# Patient Record
Sex: FEMALE | Race: BLACK OR AFRICAN AMERICAN | NOT HISPANIC OR LATINO | Employment: FULL TIME | URBAN - METROPOLITAN AREA
[De-identification: names, ages, dates, MRNs, and addresses within clinical notes are randomized per-mention and may not be internally consistent; named-entity substitution may affect disease eponyms.]

---

## 2023-11-01 ENCOUNTER — APPOINTMENT (OUTPATIENT)
Dept: CT IMAGING | Facility: CLINIC | Age: 43
End: 2023-11-01
Attending: EMERGENCY MEDICINE
Payer: COMMERCIAL

## 2023-11-01 ENCOUNTER — HOSPITAL ENCOUNTER (EMERGENCY)
Facility: CLINIC | Age: 43
Discharge: HOME | End: 2023-11-01
Attending: EMERGENCY MEDICINE | Admitting: EMERGENCY MEDICINE
Payer: COMMERCIAL

## 2023-11-01 VITALS
TEMPERATURE: 98.1 F | HEART RATE: 64 BPM | DIASTOLIC BLOOD PRESSURE: 66 MMHG | WEIGHT: 130.3 LBS | RESPIRATION RATE: 16 BRPM | OXYGEN SATURATION: 100 % | SYSTOLIC BLOOD PRESSURE: 101 MMHG

## 2023-11-01 DIAGNOSIS — R10.84 GENERALIZED ABDOMINAL PAIN: ICD-10-CM

## 2023-11-01 DIAGNOSIS — R42 DIZZINESS: ICD-10-CM

## 2023-11-01 LAB
ALBUMIN SERPL BCG-MCNC: 4 G/DL (ref 3.5–5.2)
ALBUMIN UR-MCNC: NEGATIVE MG/DL
ALP SERPL-CCNC: 76 U/L (ref 35–104)
ALT SERPL W P-5'-P-CCNC: 11 U/L (ref 0–50)
ANION GAP SERPL CALCULATED.3IONS-SCNC: 7 MMOL/L (ref 7–15)
APPEARANCE UR: CLEAR
AST SERPL W P-5'-P-CCNC: 25 U/L (ref 0–45)
BASOPHILS # BLD AUTO: 0 10E3/UL (ref 0–0.2)
BASOPHILS NFR BLD AUTO: 1 %
BILIRUB SERPL-MCNC: 0.2 MG/DL
BILIRUB UR QL STRIP: NEGATIVE
BUN SERPL-MCNC: 7.4 MG/DL (ref 6–20)
CALCIUM SERPL-MCNC: 9 MG/DL (ref 8.6–10)
CHLORIDE SERPL-SCNC: 105 MMOL/L (ref 98–107)
COLOR UR AUTO: ABNORMAL
CREAT SERPL-MCNC: 0.49 MG/DL (ref 0.51–0.95)
DEPRECATED HCO3 PLAS-SCNC: 26 MMOL/L (ref 22–29)
EGFRCR SERPLBLD CKD-EPI 2021: >90 ML/MIN/1.73M2
EOSINOPHIL # BLD AUTO: 0.1 10E3/UL (ref 0–0.7)
EOSINOPHIL NFR BLD AUTO: 2 %
ERYTHROCYTE [DISTWIDTH] IN BLOOD BY AUTOMATED COUNT: 15.9 % (ref 10–15)
GLUCOSE SERPL-MCNC: 89 MG/DL (ref 70–99)
GLUCOSE UR STRIP-MCNC: NEGATIVE MG/DL
HCG UR QL: NEGATIVE
HCT VFR BLD AUTO: 36.8 % (ref 35–47)
HGB BLD-MCNC: 11.6 G/DL (ref 11.7–15.7)
HGB UR QL STRIP: NEGATIVE
IMM GRANULOCYTES # BLD: 0 10E3/UL
IMM GRANULOCYTES NFR BLD: 1 %
KETONES UR STRIP-MCNC: NEGATIVE MG/DL
LEUKOCYTE ESTERASE UR QL STRIP: NEGATIVE
LYMPHOCYTES # BLD AUTO: 1.9 10E3/UL (ref 0.8–5.3)
LYMPHOCYTES NFR BLD AUTO: 42 %
MCH RBC QN AUTO: 26.5 PG (ref 26.5–33)
MCHC RBC AUTO-ENTMCNC: 31.5 G/DL (ref 31.5–36.5)
MCV RBC AUTO: 84 FL (ref 78–100)
MONOCYTES # BLD AUTO: 0.5 10E3/UL (ref 0–1.3)
MONOCYTES NFR BLD AUTO: 12 %
MUCOUS THREADS #/AREA URNS LPF: PRESENT /LPF
NEUTROPHILS # BLD AUTO: 1.8 10E3/UL (ref 1.6–8.3)
NEUTROPHILS NFR BLD AUTO: 42 %
NITRATE UR QL: NEGATIVE
NRBC # BLD AUTO: 0 10E3/UL
NRBC BLD AUTO-RTO: 0 /100
PH UR STRIP: 6.5 [PH] (ref 5–7)
PLATELET # BLD AUTO: 239 10E3/UL (ref 150–450)
POTASSIUM SERPL-SCNC: 3.6 MMOL/L (ref 3.4–5.3)
PROT SERPL-MCNC: 7.3 G/DL (ref 6.4–8.3)
RBC # BLD AUTO: 4.38 10E6/UL (ref 3.8–5.2)
RBC URINE: 0 /HPF
SODIUM SERPL-SCNC: 138 MMOL/L (ref 135–145)
SP GR UR STRIP: 1 (ref 1–1.03)
SQUAMOUS EPITHELIAL: <1 /HPF
UROBILINOGEN UR STRIP-MCNC: NORMAL MG/DL
WBC # BLD AUTO: 4.3 10E3/UL (ref 4–11)
WBC URINE: 0 /HPF

## 2023-11-01 PROCEDURE — 99284 EMERGENCY DEPT VISIT MOD MDM: CPT | Performed by: EMERGENCY MEDICINE

## 2023-11-01 PROCEDURE — 81025 URINE PREGNANCY TEST: CPT | Performed by: EMERGENCY MEDICINE

## 2023-11-01 PROCEDURE — 96361 HYDRATE IV INFUSION ADD-ON: CPT

## 2023-11-01 PROCEDURE — 36415 COLL VENOUS BLD VENIPUNCTURE: CPT | Performed by: EMERGENCY MEDICINE

## 2023-11-01 PROCEDURE — 80053 COMPREHEN METABOLIC PANEL: CPT | Performed by: EMERGENCY MEDICINE

## 2023-11-01 PROCEDURE — 258N000003 HC RX IP 258 OP 636: Performed by: EMERGENCY MEDICINE

## 2023-11-01 PROCEDURE — 74176 CT ABD & PELVIS W/O CONTRAST: CPT

## 2023-11-01 PROCEDURE — 81001 URINALYSIS AUTO W/SCOPE: CPT | Performed by: EMERGENCY MEDICINE

## 2023-11-01 PROCEDURE — 99284 EMERGENCY DEPT VISIT MOD MDM: CPT | Mod: 25

## 2023-11-01 PROCEDURE — 87491 CHLMYD TRACH DNA AMP PROBE: CPT | Performed by: EMERGENCY MEDICINE

## 2023-11-01 PROCEDURE — 85025 COMPLETE CBC W/AUTO DIFF WBC: CPT | Performed by: EMERGENCY MEDICINE

## 2023-11-01 PROCEDURE — 96360 HYDRATION IV INFUSION INIT: CPT

## 2023-11-01 RX ADMIN — SODIUM CHLORIDE 1000 ML: 9 INJECTION, SOLUTION INTRAVENOUS at 17:11

## 2023-11-01 ASSESSMENT — ACTIVITIES OF DAILY LIVING (ADL)
ADLS_ACUITY_SCORE: 35

## 2023-11-01 NOTE — ED TRIAGE NOTES
Triage Assessment (Adult)       Row Name 11/01/23 1546          Triage Assessment    Airway WDL WDL        Respiratory WDL    Respiratory WDL WDL        Skin Circulation/Temperature WDL    Skin Circulation/Temperature WDL WDL        Cardiac WDL    Cardiac WDL WDL        Peripheral/Neurovascular WDL    Peripheral Neurovascular WDL WDL        Cognitive/Neuro/Behavioral WDL    Cognitive/Neuro/Behavioral WDL WDL

## 2023-11-01 NOTE — ED PROVIDER NOTES
ED Provider Note  [facility]      History     Chief Complaint   Patient presents with    Anorexia     Loss of appetite and insomnia.       HPI  Dora Tello is a 43 year old female with no pertinent medical history who presents to the ED for dizziness, fatigue, and inability to eat or sleep.  She says she was having trouble with sleep for the past 8 months.  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 She is unable to fall asleep until 11 am and then sleeps until 5 pm.  She has 2 days off per week.  She says she wants to then just sleep all day but doesn't want to do that. She doesn't take any sleep aids.  She has noted lack of appetite for the past 2 months.  She has lost 14 lbs over the past 8 months.  She felt dizzy today so came here for evaluation. She also has lower and upper abdominal pain. She says she has issues with eating.  No stooling concerns. She is not pregnant. Her menses last 5 days.  No urination concerns.  No f/c.  She does not have a pcp.  No ear ringing/pain/discharge.     History reviewed. No pertinent past medical history.  Past Surgical History:   Procedure Laterality Date     SECTION       No current outpatient medications on file.    No Known Allergies  Family History  No family history on file.  Social History          A medically appropriate review of systems was performed with pertinent positives and negatives noted in the HPI, and all other systems negative.    Physical Exam   BP: 116/78  Pulse: 84  Temp: 97.5  F (36.4  C)  Resp: 16  Weight: 59.1 kg (130 lb 4.8 oz)  SpO2: 99 %  Lying Orthostatic BP: 103/69  Lying Orthostatic Pulse: 72 bpm  Sitting Orthostatic BP: 112/76  Sitting Orthostatic Pulse: 70 bpm  Standing Orthostatic BP: 112/74  Standing Orthostatic Pulse: 69 bpm  Physical Exam  Vitals and nursing note reviewed.   Constitutional:       General: She is not in acute distress.     Appearance: She is normal weight. She is not ill-appearing,  toxic-appearing or diaphoretic.   HENT:      Head: Normocephalic and atraumatic.      Right Ear: External ear normal.      Left Ear: External ear normal.      Nose: Nose normal.      Mouth/Throat:      Mouth: Mucous membranes are moist.   Eyes:      General: No scleral icterus.        Right eye: No discharge.         Left eye: No discharge.      Extraocular Movements: Extraocular movements intact.      Conjunctiva/sclera: Conjunctivae normal.      Pupils: Pupils are equal, round, and reactive to light.   Cardiovascular:      Rate and Rhythm: Normal rate and regular rhythm.      Heart sounds: Normal heart sounds.   Pulmonary:      Effort: Pulmonary effort is normal.      Breath sounds: Normal breath sounds.   Abdominal:      General: Abdomen is flat. Bowel sounds are normal. There is no distension.      Palpations: Abdomen is soft. There is no mass.      Tenderness: There is no abdominal tenderness. There is no guarding or rebound.      Hernia: No hernia is present.   Musculoskeletal:         General: Normal range of motion.      Cervical back: Normal range of motion and neck supple.   Skin:     General: Skin is warm and dry.      Coloration: Skin is not jaundiced or pale.   Neurological:      General: No focal deficit present.      Mental Status: She is alert and oriented to person, place, and time.   Psychiatric:         Mood and Affect: Mood normal.         Behavior: Behavior normal.         Thought Content: Thought content normal.         Judgment: Judgment normal.           ED Course, Procedures, & Data      Procedures                     Results for orders placed or performed during the hospital encounter of 11/01/23   CT Abdomen Pelvis w/o Contrast     Status: None    Narrative    EXAM: CT ABDOMEN PELVIS W/O CONTRAST  LOCATION: [facility]  DATE: 11/1/2023    INDICATION: pain upper abdomen and suprapubic.  COMPARISON: None.  TECHNIQUE: CT scan of the abdomen and  pelvis was performed without IV contrast. Multiplanar reformats were obtained. Dose reduction techniques were used.  CONTRAST: None.    FINDINGS:   LOWER CHEST: Small hiatal hernia. Visualization of the left ventricular myocardium on noncontrast CT is compatible with anemia.    HEPATOBILIARY: Normal.    PANCREAS: Normal.    SPLEEN: Normal.    ADRENAL GLANDS: Normal.    KIDNEYS/BLADDER: Normal.    BOWEL: No free air, free fluid, or inflammatory change. No obstruction. Normal appendix.    LYMPH NODES: Normal.    VASCULATURE: Unremarkable.    PELVIC ORGANS: Normal.    MUSCULOSKELETAL: Normal.      Impression    IMPRESSION:   1.  No acute abnormality in the abdomen or pelvis.     Comprehensive metabolic panel     Status: Abnormal   Result Value Ref Range    Sodium 138 135 - 145 mmol/L    Potassium 3.6 3.4 - 5.3 mmol/L    Carbon Dioxide (CO2) 26 22 - 29 mmol/L    Anion Gap 7 7 - 15 mmol/L    Urea Nitrogen 7.4 6.0 - 20.0 mg/dL    Creatinine 0.49 (L) 0.51 - 0.95 mg/dL    GFR Estimate >90 >60 mL/min/1.73m2    Calcium 9.0 8.6 - 10.0 mg/dL    Chloride 105 98 - 107 mmol/L    Glucose 89 70 - 99 mg/dL    Alkaline Phosphatase 76 35 - 104 U/L    AST 25 0 - 45 U/L    ALT 11 0 - 50 U/L    Protein Total 7.3 6.4 - 8.3 g/dL    Albumin 4.0 3.5 - 5.2 g/dL    Bilirubin Total 0.2 <=1.2 mg/dL   UA with Microscopic reflex to Culture     Status: Abnormal    Specimen: Urine, Clean Catch   Result Value Ref Range    Color Urine Straw Colorless, Straw, Light Yellow, Yellow    Appearance Urine Clear Clear    Glucose Urine Negative Negative mg/dL    Bilirubin Urine Negative Negative    Ketones Urine Negative Negative mg/dL    Specific Gravity Urine 1.005 1.003 - 1.035    Blood Urine Negative Negative    pH Urine 6.5 5.0 - 7.0    Protein Albumin Urine Negative Negative mg/dL    Urobilinogen Urine Normal Normal, 2.0 mg/dL    Nitrite Urine Negative Negative    Leukocyte Esterase Urine Negative Negative    Mucus Urine Present (A) None Seen /LPF    RBC  Urine 0 <=2 /HPF    WBC Urine 0 <=5 /HPF    Squamous Epithelials Urine <1 <=1 /HPF    Narrative    Urine Culture not indicated   HCG qualitative urine (UPT)     Status: Normal   Result Value Ref Range    hCG Urine Qualitative Negative Negative   CBC with platelets and differential     Status: Abnormal   Result Value Ref Range    WBC Count 4.3 4.0 - 11.0 10e3/uL    RBC Count 4.38 3.80 - 5.20 10e6/uL    Hemoglobin 11.6 (L) 11.7 - 15.7 g/dL    Hematocrit 36.8 35.0 - 47.0 %    MCV 84 78 - 100 fL    MCH 26.5 26.5 - 33.0 pg    MCHC 31.5 31.5 - 36.5 g/dL    RDW 15.9 (H) 10.0 - 15.0 %    Platelet Count 239 150 - 450 10e3/uL    % Neutrophils 42 %    % Lymphocytes 42 %    % Monocytes 12 %    % Eosinophils 2 %    % Basophils 1 %    % Immature Granulocytes 1 %    NRBCs per 100 WBC 0 <1 /100    Absolute Neutrophils 1.8 1.6 - 8.3 10e3/uL    Absolute Lymphocytes 1.9 0.8 - 5.3 10e3/uL    Absolute Monocytes 0.5 0.0 - 1.3 10e3/uL    Absolute Eosinophils 0.1 0.0 - 0.7 10e3/uL    Absolute Basophils 0.0 0.0 - 0.2 10e3/uL    Absolute Immature Granulocytes 0.0 <=0.4 10e3/uL    Absolute NRBCs 0.0 10e3/uL   Chlamydia trachomatis/Neisseria gonorrhoeae by PCR     Status: Normal    Specimen: Urine, Voided   Result Value Ref Range    Chlamydia Trachomatis Negative Negative    Neisseria gonorrhoeae Negative Negative   CBC with platelets differential     Status: Abnormal    Narrative    The following orders were created for panel order CBC with platelets differential.  Procedure                               Abnormality         Status                     ---------                               -----------         ------                     CBC with platelets and d...[[id number]]  Abnormal            Final result                 Please view results for these tests on the individual orders.     Medications   sodium chloride 0.9% BOLUS 1,000 mL (0 mLs Intravenous Stopped 11/1/23 1943)     Labs Ordered and Resulted from Time of ED Arrival to Time of  ED Departure   COMPREHENSIVE METABOLIC PANEL - Abnormal       Result Value    Sodium 138      Potassium 3.6      Carbon Dioxide (CO2) 26      Anion Gap 7      Urea Nitrogen 7.4      Creatinine 0.49 (*)     GFR Estimate >90      Calcium 9.0      Chloride 105      Glucose 89      Alkaline Phosphatase 76      AST 25      ALT 11      Protein Total 7.3      Albumin 4.0      Bilirubin Total 0.2     ROUTINE UA WITH MICROSCOPIC REFLEX TO CULTURE - Abnormal    Color Urine Straw      Appearance Urine Clear      Glucose Urine Negative      Bilirubin Urine Negative      Ketones Urine Negative      Specific Gravity Urine 1.005      Blood Urine Negative      pH Urine 6.5      Protein Albumin Urine Negative      Urobilinogen Urine Normal      Nitrite Urine Negative      Leukocyte Esterase Urine Negative      Mucus Urine Present (*)     RBC Urine 0      WBC Urine 0      Squamous Epithelials Urine <1     CBC WITH PLATELETS AND DIFFERENTIAL - Abnormal    WBC Count 4.3      RBC Count 4.38      Hemoglobin 11.6 (*)     Hematocrit 36.8      MCV 84      MCH 26.5      MCHC 31.5      RDW 15.9 (*)     Platelet Count 239      % Neutrophils 42      % Lymphocytes 42      % Monocytes 12      % Eosinophils 2      % Basophils 1      % Immature Granulocytes 1      NRBCs per 100 WBC 0      Absolute Neutrophils 1.8      Absolute Lymphocytes 1.9      Absolute Monocytes 0.5      Absolute Eosinophils 0.1      Absolute Basophils 0.0      Absolute Immature Granulocytes 0.0      Absolute NRBCs 0.0     HCG QUALITATIVE URINE - Normal    hCG Urine Qualitative Negative       CT Abdomen Pelvis w/o Contrast   Final Result   IMPRESSION:    1.  No acute abnormality in the abdomen or pelvis.                Critical care was not performed.     Medical Decision Making  The patient's presentation was of moderate complexity (an acute illness with systemic symptoms).    The patient's evaluation involved:  ordering and/or review of 3+ test(s) in this encounter (see  separate area of note for details)    The patient's management necessitated moderate risk (prescription drug management including medications given in the ED).    Assessment & Plan    Dora Tello is a 43 year old female with no pertinent medical history who presents to the ED for dizziness, fatigue, and inability to eat or sleep.  The patient appears well on exam.  Orthostatics were checked and normal.  Labs and urine were ordered and reviewed.  No anemia/hemoglobin is 11.6. upt was checked and negative.  Ua checked to evaluate for uti and negative. No electrolyte concerns to explain her symptoms.  She is tender lower and upper abdomen but no rebound/guarding.  CT abd/pelvis was ordered and is negative upon review.  She slept her entire time in the ed.  We talked about sleep and I recommended melatonin and sleep apps on youtube such as rain or ocean sounds to quiet her mind to help her sleep when she gets home from work.  She was given NS 1 L IV and says she feels better. She lives near the [facility] so have recommended she follow up with them for further follow up of her concerns and to establish pcp. Gc/chd was sent and is pending. She was discharged home.       I have reviewed the nursing notes. I have reviewed the findings, diagnosis, plan and need for follow up with the patient.    There are no discharge medications for this patient.      Final diagnoses:   Dizziness   Generalized abdominal pain       Debby Hunter MD    [facility] EMERGENCY DEPARTMENT  11/1/2023     Debby Hunter MD  11/1980

## 2023-11-01 NOTE — Clinical Note
Dora Tello was seen and treated in our emergency department on 11/1/2023.  She may return to work on 11/02/2023.       If you have any questions or concerns, please don't hesitate to call.      Debby Hunter MD

## 2023-11-02 LAB
C TRACH DNA SPEC QL PROBE+SIG AMP: NEGATIVE
N GONORRHOEA DNA SPEC QL NAA+PROBE: NEGATIVE

## 2023-11-02 NOTE — DISCHARGE INSTRUCTIONS
To help you sleep, you can take melatonin 5 mg 1 hour prior to sleeping.  You can buy this at any [employer], [employer], [employer], etc.      Try using a sleep alexis on your phone to help you to sleep.  You can find sounds of rain or the ocean that last 30 minutes on youtube and will help you fall asleep.     Follow up at the [facility] to get a primary care provider.  They can see you regarding your feeling dizzy and trouble eating and can keep working with you regarding these issues.     Work note given.

## 2024-12-28 ENCOUNTER — APPOINTMENT (OUTPATIENT)
Dept: GENERAL RADIOLOGY | Facility: CLINIC | Age: 44
End: 2024-12-28
Attending: EMERGENCY MEDICINE
Payer: COMMERCIAL

## 2024-12-28 ENCOUNTER — HOSPITAL ENCOUNTER (EMERGENCY)
Facility: CLINIC | Age: 44
Discharge: HOME | End: 2024-12-28
Attending: EMERGENCY MEDICINE | Admitting: EMERGENCY MEDICINE
Payer: COMMERCIAL

## 2024-12-28 VITALS
TEMPERATURE: 98.7 F | SYSTOLIC BLOOD PRESSURE: 125 MMHG | DIASTOLIC BLOOD PRESSURE: 78 MMHG | RESPIRATION RATE: 16 BRPM | OXYGEN SATURATION: 98 % | HEART RATE: 77 BPM

## 2024-12-28 DIAGNOSIS — J20.5 ACUTE BRONCHITIS DUE TO RESPIRATORY SYNCYTIAL VIRUS (RSV): ICD-10-CM

## 2024-12-28 LAB
FLUAV RNA SPEC QL NAA+PROBE: NEGATIVE
FLUBV RNA RESP QL NAA+PROBE: NEGATIVE
RSV RNA SPEC NAA+PROBE: POSITIVE
SARS-COV-2 RNA RESP QL NAA+PROBE: NEGATIVE

## 2024-12-28 PROCEDURE — 99284 EMERGENCY DEPT VISIT MOD MDM: CPT | Mod: 25 | Performed by: EMERGENCY MEDICINE

## 2024-12-28 PROCEDURE — 99284 EMERGENCY DEPT VISIT MOD MDM: CPT | Performed by: EMERGENCY MEDICINE

## 2024-12-28 PROCEDURE — 250N000013 HC RX MED GY IP 250 OP 250 PS 637: Performed by: EMERGENCY MEDICINE

## 2024-12-28 PROCEDURE — 87637 SARSCOV2&INF A&B&RSV AMP PRB: CPT | Performed by: EMERGENCY MEDICINE

## 2024-12-28 PROCEDURE — 71046 X-RAY EXAM CHEST 2 VIEWS: CPT

## 2024-12-28 RX ORDER — ACETAMINOPHEN 500 MG
1000 TABLET ORAL EVERY 6 HOURS PRN
Qty: 30 TABLET | Refills: 0 | Status: SHIPPED | OUTPATIENT
Start: 2024-12-28

## 2024-12-28 RX ORDER — BENZONATATE 100 MG/1
100 CAPSULE ORAL 2 TIMES DAILY PRN
Qty: 10 CAPSULE | Refills: 0 | Status: SHIPPED | OUTPATIENT
Start: 2024-12-28 | End: 2025-01-02

## 2024-12-28 RX ORDER — ACETAMINOPHEN 325 MG/1
975 TABLET ORAL ONCE
Status: COMPLETED | OUTPATIENT
Start: 2024-12-28 | End: 2024-12-28

## 2024-12-28 RX ORDER — IBUPROFEN 200 MG
600 TABLET ORAL EVERY 6 HOURS PRN
Qty: 30 TABLET | Refills: 0 | Status: SHIPPED | OUTPATIENT
Start: 2024-12-28

## 2024-12-28 RX ADMIN — ACETAMINOPHEN 975 MG: 325 TABLET, FILM COATED ORAL at 17:40

## 2024-12-28 ASSESSMENT — ACTIVITIES OF DAILY LIVING (ADL)
ADLS_ACUITY_SCORE: 41

## 2024-12-28 ASSESSMENT — COLUMBIA-SUICIDE SEVERITY RATING SCALE - C-SSRS
6. HAVE YOU EVER DONE ANYTHING, STARTED TO DO ANYTHING, OR PREPARED TO DO ANYTHING TO END YOUR LIFE?: NO
1. IN THE PAST MONTH, HAVE YOU WISHED YOU WERE DEAD OR WISHED YOU COULD GO TO SLEEP AND NOT WAKE UP?: NO
2. HAVE YOU ACTUALLY HAD ANY THOUGHTS OF KILLING YOURSELF IN THE PAST MONTH?: NO

## 2024-12-28 NOTE — ED TRIAGE NOTES
Pt presents with headache and chest discomfort/throat soreness.  Pt coughing a lot.  Pt symptoms present for four days.  9/10 generalized body aches  Tylenol taking at 0900 this am.

## 2024-12-28 NOTE — ED PROVIDER NOTES
ED Provider Note  [facility]      History     Chief Complaint   Patient presents with    Cough     HPI  Dora Tello is a 44 year old female who presents to the emergency department for headache, chest discomfort, sore throat, and cough that began 4 days ago.  Patient states she has been coughing too much, and describes cough as dry.  Patient has throat pain, headache, chest pain and shortness of breath, was feverish, and has been using Tylenol.  LMP was 20 days ago.  No chance of pregnancy no nausea or vomiting.  No abdominal pain.  No contacts.  No other medical concerns.      Physical Exam   BP: 105/70  Pulse: 94  Temp: 98.7  F (37.1  C)  Resp: 12  SpO2: 100 %  Physical Exam  General: patient is alert and oriented and in no acute distress   Head: atraumatic and normocephalic   EENT: moist mucus membranes without tonsillar erythema or exudates  Neck: supple   Cardiovascular: regular rate and rhythm, extremities warm and well perfused, no lower extremity edema  Pulmonary: lungs clear to auscultation bilaterally   Abdomen: soft, non-tender   Musculoskeletal: normal range of motion   Neurological: alert and oriented, moving all extremities symmetrically, gait normal   Skin: warm, dry     ED Course, Procedures, & Data      Procedures            No results found for any visits on 12/28/24.  Medications - No data to display  Labs Ordered and Resulted from Time of ED Arrival to Time of ED Departure - No data to display  No orders to display          Critical care was not performed.     Medical Decision Making  The patient's presentation was of moderate complexity (an acute illness with systemic symptoms).    The patient's evaluation involved:  ordering and/or review of 2 test(s) in this encounter (see separate area of note for details)  independent interpretation of testing performed by another health professional (CXR)    The patient's management necessitated moderate risk (prescription drug  management including medications given in the ED).    Assessment & Plan    Ms. Tello is a 44 year old female who presents to the emergency department for headache, chest discomfort, sore throat, and cough.  She is hemodynamically stable, afebrile and in no respiratory distress.  She is positive for RSV on viral respiratory swab.  Chest x-ray is negative for evidence of pneumonia.  She is able to tolerate p.o. well and does not appear significantly dehydrated.  I have recommended symptomatic management for her symptoms and was also given a prescription for Tessalon Perles.  She was given close return precautions for the emergency department and voiced understanding.    I have reviewed the nursing notes. I have reviewed the findings, diagnosis, plan and need for follow up with the patient.    New Prescriptions    ACETAMINOPHEN (TYLENOL) 500 MG TABLET    Take 2 tablets (1,000 mg) by mouth every 6 hours as needed for mild pain.    BENZONATATE (TESSALON) 100 MG CAPSULE    Take 1 capsule (100 mg) by mouth 2 times daily as needed for cough.    IBUPROFEN (ADVIL/MOTRIN) 200 MG TABLET    Take 3 tablets (600 mg) by mouth every 6 hours as needed for mild pain.       Final diagnoses:   Acute bronchitis due to respiratory syncytial virus (RSV)   I, Tucker Espinoza, am serving as a trained medical scribe to document services personally performed by Candelaria Jameson MD, based to the provider's statements to me.     I, Candelaria Jameson MD, was physically present and have reviewed and verified the accuracy of this note documented by Tucker Espinoza.     Candelaria Jameson MD  [facility] EMERGENCY DEPARTMENT  12/28/2024     Candelaria Jameson MD  12/28/24 3059

## 2024-12-28 NOTE — DISCHARGE INSTRUCTIONS
Please make an appointment to follow up with Your Primary Care Provider in 10-14 days as needed.    Use Tessalon Perles as needed for cough suppressant.  Also use acetaminophen and ibuprofen as needed for fever and bodyaches.  Drink frequent small amounts of fluid throughout the day to stay well-hydrated.    If you have any worsening symptoms including increased shortness of breath, intractable vomiting or other concerns return to the emergency department for reevaluation.